# Patient Record
Sex: FEMALE
[De-identification: names, ages, dates, MRNs, and addresses within clinical notes are randomized per-mention and may not be internally consistent; named-entity substitution may affect disease eponyms.]

---

## 2023-03-05 ENCOUNTER — NURSE TRIAGE (OUTPATIENT)
Dept: OTHER | Facility: CLINIC | Age: 33
End: 2023-03-05

## 2023-03-06 NOTE — TELEPHONE ENCOUNTER
Location of patient: [place]    Subjective: Caller states \"I had appt this past Friday - I was open 1 cm. \"    Currently 8 weeks. 2nd pregnancy. KEITH 3/11/23    Had epidural for 1st pregnancy. Pressure in pelvic area. Feels baby moving \"and it hurts. \"    Denies water breaking        [address] discharge after using bathroom - wearing a pad    Current Symptoms: abdominal pain, tightening,  brown discharge    Associated Symptoms: reduced activity    Pain Severity: 5/10; Temperature: NA     What has been tried: NA    Recommended disposition: Go to L&D now. Care advice provided, patient verbalizes understanding; denies any other questions or concerns. Outcome:  Proceed to L&D for further evaluation.       This triage is a result of a call to the [address] [place] [address]      Reason for Disposition   [1] Having contractions or other symptoms of labor (such as vaginal pressure) AND [2] 37 or more weeks pregnant (i.e., term pregnancy)   [1] Leakage of fluid from vagina AND [2] green or brown in color    Protocols used: Pregnancy - Abdominal Pain Greater Than 20 Weeks EGA-ADULT-AH, Pregnancy - Labor-ADULT-AH